# Patient Record
(demographics unavailable — no encounter records)

---

## 2025-01-14 NOTE — HISTORY OF PRESENT ILLNESS
[FreeTextEntry1] : Here for follow-up Still hasn't seen endocrinologist as directed,. [de-identified] : Feeling well